# Patient Record
Sex: FEMALE | Race: OTHER | HISPANIC OR LATINO
[De-identification: names, ages, dates, MRNs, and addresses within clinical notes are randomized per-mention and may not be internally consistent; named-entity substitution may affect disease eponyms.]

---

## 2020-09-17 PROBLEM — Z00.00 ENCOUNTER FOR PREVENTIVE HEALTH EXAMINATION: Status: ACTIVE | Noted: 2020-09-17

## 2020-09-26 ENCOUNTER — APPOINTMENT (OUTPATIENT)
Dept: ORTHOPEDIC SURGERY | Facility: CLINIC | Age: 70
End: 2020-09-26
Payer: MEDICARE

## 2020-09-26 VITALS
HEART RATE: 92 BPM | SYSTOLIC BLOOD PRESSURE: 124 MMHG | HEIGHT: 61 IN | DIASTOLIC BLOOD PRESSURE: 72 MMHG | BODY MASS INDEX: 27.38 KG/M2 | WEIGHT: 145 LBS

## 2020-09-26 DIAGNOSIS — Z96.642 PRESENCE OF LEFT ARTIFICIAL HIP JOINT: ICD-10-CM

## 2020-09-26 DIAGNOSIS — M16.11 UNILATERAL PRIMARY OSTEOARTHRITIS, RIGHT HIP: ICD-10-CM

## 2020-09-26 PROCEDURE — 99204 OFFICE O/P NEW MOD 45 MIN: CPT

## 2020-09-26 NOTE — HISTORY OF PRESENT ILLNESS
[de-identified] : 70-year-old female who a few years ago developed spontaneous onset of chronic intermittent pain lateral aspect right hip radiating into her groin. Pain provoked by walking climbing stairs and bending over such as when in the garden. Symptom severity increased this past July and August. Since taking Celebrex reports marked decrease in symptom severity but still limited in walking tolerance such as when shopping

## 2020-09-26 NOTE — DISCUSSION/SUMMARY
[de-identified] : Impression right hip osteoarthritis\par Plan; patient advised that if wall T. of life is acceptable to her given symptom improvement and response to Celebrex no further treatment indicated. If however she feels quality of life is compromised such as when pain provoked by shopping a supermarket, notwithstanding use of Celebrex and recommendation for a cane patient is a candidate for total hip replacement.

## 2020-09-26 NOTE — PHYSICAL EXAM
[de-identified] : Constitutional:Well nourished , well developed and in no acute distress\par Psychiatric: Alert and oriented to time place and person.Appropriate affect\par Respiratory: Unlabored respirations,no audible wheezing\par Cardiovascular: no leg swelling  ankle edema\par Vascular: no calf or thigh tenderness, \par Peripheral pulses; intact\par Skin:Head, neck, arms and lower extremities:no lesions or discoloration\par Lymphatics:No groin adenopathy\par Neurological: intact light touch sensation and grossly intact coordination and motor power.\par Right hip satisfactory gait leg lengths equal passive motion flexion 90 internal -15 external 30 abduction 30 adduction 35\par Right knee satisfactory alignment no effusion motion full ligaments intact\par  [de-identified] : Review outside x-rays AP pelvis right hip right hip degenerative narrowing and marginal femoral head and acetabular osteophytes. 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

## 2025-06-04 ENCOUNTER — NON-APPOINTMENT (OUTPATIENT)
Age: 75
End: 2025-06-04

## 2025-06-04 ENCOUNTER — APPOINTMENT (OUTPATIENT)
Age: 75
End: 2025-06-04
Payer: MEDICARE

## 2025-06-04 PROCEDURE — 92014 COMPRE OPH EXAM EST PT 1/>: CPT
